# Patient Record
(demographics unavailable — no encounter records)

---

## 2024-10-17 NOTE — PLAN
[FreeTextEntry1] : anxiety- wean sertraline off and start escitalopram  12 minutes was spent caring for the patient today.  This includes time spent before the visit reviewing the chart, time spent during the visit,  time spent counseling, and educating the patient on the disease course and it's treatment/ management and as well as time spent after the visit on documentation.

## 2024-10-17 NOTE — HISTORY OF PRESENT ILLNESS
[Home] : at home, [unfilled] , at the time of the visit. [Medical Office: ([facility])___] : at the medical office located in  [Verbal consent obtained from patient] : the patient, [unfilled] [FreeTextEntry1] : anxiety [de-identified] : anxiety- last visit- started sertraline.  pt weaned off zoloft.  KUMAR 7 score 13.  sleep unchg. no nausea or suicidal thoughts. pt concerned about wgt gain on sertraline

## 2024-10-22 NOTE — CONSULT LETTER
[Dear  ___] : Dear  [unfilled], [Consult Letter:] : I had the pleasure of evaluating your patient, [unfilled]. [Please see my note below.] : Please see my note below. [Consult Closing:] : Thank you very much for allowing me to participate in the care of this patient.  If you have any questions, please do not hesitate to contact me. [Sincerely,] : Sincerely, [DrMonique  ___] : Dr. RAMIREZ [FreeTextEntry2] : Ayaka Diego MD [FreeTextEntry3] : Donato Nice M.D., FACP Professor of Medicine [employer] [employer] [facility] [employer] at [facility] Associate Chief, Division of Hematology [place] [phone]

## 2024-10-22 NOTE — ASSESSMENT
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 2 of her sons also have chronic neutropenia. I have seen 1 of them in consultation and he has a similar history. Screening for congenital neutropenia had been recommended to him, but he never returned for the testing. 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 These patients are not at an increased risk for serious infection. Her neutropenia has been borderline, She was also found to have IgG kappa MGUS present in miniscule amounts I reviewed both diagnoses in detail with her. I explained that MGUS is part of the normal aging process, that 5% of individuals are affected by age 60 and 25% by age 90.  I reviewed that there is a small risk of transformation to non-Hodgkin lymphoma or multiple myeloma. We reviewed the need to monitor for this. All questions were answered.  Plan: Observe CMP, LDH, SPEP, Quant Ig, SFLC, Iron/TIBC, B12, MAG antibodies Seek immediate medical attention if febrile.  Avoid IV CT scan contrast dye.  PCP/Neuro/GI/Podiatry f/u RTC 6 months

## 2024-10-22 NOTE — REVIEW OF SYSTEMS
[Joint Pain] : joint pain [Fatigue] : fatigue [SOB on Exertion] : shortness of breath during exertion [Negative] : Neurological

## 2024-10-22 NOTE — PHYSICAL EXAM
[Restricted in physically strenuous activity but ambulatory and able to carry out work of a light or sedentary nature] : Status 1- Restricted in physically strenuous activity but ambulatory and able to carry out work of a light or sedentary nature, e.g., light house work, office work [Normal] : affect appropriate [de-identified] : Bilateral arcus senilis [de-identified] : Heberden's nodes. Feet benign.

## 2024-10-22 NOTE — RESULTS/DATA
[FreeTextEntry1] : WBC 3,390 Hgb 13.9 Hct 41.6 MCV 87.2 Platelets 280,000 Diff 38P 53L 6M 2Eo 1Ba ANC 1,280   4/16/24 CMP normal   SPEP M-spike 0.1, gamma-migrating paraprotein identified.  SPIEP IgG kappa band identified.  IgG 860 IgA 144 IgM 83 SFLC kappa 2.04 lambda 0.94 ratio 2.17  Beta 2 microglobulin 1.7 CRP 4 Cryoglobulin negative

## 2024-10-22 NOTE — ADDENDUM
[FreeTextEntry1] : I, Oswaldo Cabrera, acted solely as a scribe for Dr. Donato Nice on 10/22/2024 . All medical entries made by the Scribe were at my, Dr. Donato Nice's, direction and personally dictated by me on 10/22/2024 . I have reviewed the chart and agree that the record accurately reflects my personal performance of the history, physical exam, assessment and plan. I have also personally directed, reviewed, and agreed with the chart.

## 2024-10-22 NOTE — HISTORY OF PRESENT ILLNESS
[Disease:__________________________] : Disease: [unfilled] [de-identified] : Thyroid cancer - thyroidectomy Benign ethnic neutropenia - Devi blood group Fya and Fyb negative 2 sons have leukopenia [de-identified] : She feels fatigued. Affects her ability to perform. Notes SAINI after walking a short distance. Seeing Cardiology. Chronic lower back discomfort persists. Sees Neuro. Received epidural injection in her back with no relief. Also notes episodes of numbness and tingling in her feet. Saw Neurology and evaluation for neuropathy was normal.  Still gets upper respiratory infections involving her sinuses. Takes antibiotics for relief. Pain in both feet and toes persists, Notes left big toenail turns black in color. Will see Podiatrist soon. Is unable to take toe polish off. She is still constipated. Sees Gastroenterology for this. She notes no fever, night sweats, swollen glands, skin infections, rash, chest pain, bleeding, bruising, headaches, visual problems. abdominal pain, other skeletal pain, Declines vaccinations.

## 2025-02-05 NOTE — PHYSICAL EXAM
[No Acute Distress] : no acute distress [Well Nourished] : well nourished [Well Developed] : well developed [Well-Appearing] : well-appearing [Normal Sclera/Conjunctiva] : normal sclera/conjunctiva [Normal Oropharynx] : the oropharynx was normal [No Lymphadenopathy] : no lymphadenopathy [Supple] : supple [No Respiratory Distress] : no respiratory distress  [No Accessory Muscle Use] : no accessory muscle use [Clear to Auscultation] : lungs were clear to auscultation bilaterally [Normal Rate] : normal rate  [Regular Rhythm] : with a regular rhythm [Normal S1, S2] : normal S1 and S2 [No Murmur] : no murmur heard [No Edema] : there was no peripheral edema [Soft] : abdomen soft [Non-distended] : non-distended [No Masses] : no abdominal mass palpated [Normal Bowel Sounds] : normal bowel sounds [Normal Posterior Cervical Nodes] : no posterior cervical lymphadenopathy [Normal Anterior Cervical Nodes] : no anterior cervical lymphadenopathy [Grossly Normal Strength/Tone] : grossly normal strength/tone [No Focal Deficits] : no focal deficits [Normal Gait] : normal gait [Normal Affect] : the affect was normal [Normal Insight/Judgement] : insight and judgment were intact [No Carotid Bruits] : no carotid bruits [de-identified] : diffuse tenderness

## 2025-02-05 NOTE — REVIEW OF SYSTEMS
[Dyspnea on Exertion] : dyspnea on exertion [Abdominal Pain] : abdominal pain [Constipation] : constipation [Fever] : no fever [Chills] : no chills [Earache] : no earache [Sore Throat] : no sore throat [Chest Pain] : no chest pain [Palpitations] : no palpitations [Lower Ext Edema] : no lower extremity edema [Wheezing] : no wheezing [Cough] : no cough [Nausea] : no nausea [Diarrhea] : diarrhea [Vomiting] : no vomiting [Dysuria] : no dysuria [Dizziness] : no dizziness

## 2025-02-05 NOTE — PLAN
[FreeTextEntry1] : need results of CT angio, Cardio notes. done at [facility], GI records, ENT notes stop NSAID, ASA, over the counter meds 1 wk prior to procedure non fasting labs- Blood was collected in the office. EKG- NSR 60.  Twave inv- ant- no chg c/o 5/29/24 EKG 30 minutes was spent caring for the patient today.  This includes time spent before the visit reviewing the chart, time spent during the visit,  time spent counseling, and educating the patient on the disease course and it's treatment/ management and as well as time spent after the visit on documentation.

## 2025-02-13 NOTE — PHYSICAL EXAM
[No Acute Distress] : no acute distress [Well Nourished] : well nourished [Well Developed] : well developed [Normal Oropharynx] : normal oropharynx [Retrognathia] : retrognathia [III] : Mallampati Class: III [Normal Rate/Rhythm] : normal rate/rhythm [Normal S1, S2] : normal s1, s2 [No Resp Distress] : no resp distress [Clear to Auscultation Bilaterally] : clear to auscultation bilaterally [No Edema] : no edema [Normal Affect] : normal affect

## 2025-02-14 NOTE — ASSESSMENT
[FreeTextEntry1] : 59F never smoker with hx of thyroid cancer s/p complete thyroidectomy (on synthroid), NAFLD, chronic sinusitis, who presents for pulmonary clearance for sinus surgery, anticipated on 2/21/25 with Dr. Ashley Pruett at [facility]. She has hx of recurrent sinus infections requiring antibiotics. She is currently using Ryaltris nasal spray ( olopatadine hydrochloride and mometasone furoate). SAINI as a result of nasal congestion and mouth breathing.   Her CXR is clear. PFTs 2/13/25 normal with mild bd response Sinus images ([facility]) and cardiac workup ([facility]) are not available to us today. -pt to brig these ct sinuas for me to review---- She is on synthroid but has not been able to see her endocrinologist in over a yr and no recent thyroid functions   On Exam lungs are clear, Spo2 normal. She has a crowded upper airway and retrognathia.  In setting of snoring, suspect JAQUELIN  Plan: ch sinusitis--possible jaquelin- Labs: cbc with diff, tsh, t4, asthma rast, igE add nasal saline gel, followed by Ryaltris nasal spray bid, add ipratropium nasal spray bid  Pt will call sleep lab to schedule HST Follow up in 4 weeks

## 2025-02-14 NOTE — HISTORY OF PRESENT ILLNESS
[Never] : never [TextBox_4] : 59F with hx of thyroid cancer s/p complete thyroidectomy (on synthroid), NAFLD, chronic sinusitis, who presents for pulmonary clearance for sinus surgery, anticipated on 2/21/25 with Dr. Ashley Pruett at [facility].   She denies hx of asthma. No prior lung disease. She has recurrent sinus infections requiring antibiotics. She is currently using Ryaltris nasal spray ( olopatadine hydrochloride and mometasone furoate).  She has hx of dyspnea on exertion. Was seen by cardio and is s/p angiogram at [facility] which was reportedly ok. We dont have records. She has post nasal drip, chronic congestion, snoring, sinus pressure/pain, nasal discharge.  Dyspnea occurs on exertion, when she is required to breath through her mouth causing a sore throat and burning sensation in her throat.  Denies recent illness, fever, chills, wheezing or chest tightness.   never smoker

## 2025-02-14 NOTE — REVIEW OF SYSTEMS
[Nasal Congestion] : nasal congestion [Postnasal Drip] : postnasal drip [Negative] : Constitutional [Epistaxis] : no epistaxis

## 2025-02-14 NOTE — END OF VISIT
[FreeTextEntry3] :   I, Dr. Maegan Caldera  personally performed the evaluation and management (E/M) services for this established patient who presents today with (a) new problem(s)/exacerbation of (an) existing condition(s). That E/M includes conducting the clinically appropriate interval history &/or exam, assessing all new/exacerbated conditions, and establishing a new plan of care. Today, my AUBRIE, Michelle Guerrero NP, , was here to observe my evaluation and management service for this new problem/exacerbated condition and follow the plan of care established by me going forward. [Time Spent: ___ minutes] : I have spent [unfilled] minutes of time on the encounter which excludes teaching and separately reported services.

## 2025-02-14 NOTE — HISTORY OF PRESENT ILLNESS
[Never] : never [TextBox_4] : 59F with hx of thyroid cancer s/p complete thyroidectomy (on synthroid), NAFLD, chronic sinusitis, who presents for pulmonary clearance for sinus surgery, anticipated on 2/21/25 with Dr. Ashley Pruett at [facility].   She denies hx of asthma. No prior lung disease. She has recurrent sinus infections requiring antibiotics. She is currently using Ryaltris nasal spray ( olopatadine hydrochloride and mometasone furoate).  She has hx of dyspnea on exertion. Was seen by cardio and is s/p angiogram at [facility] which was reportedly ok. We dont have records. She has post nasal drip, chronic congestion, snoring, sinus pressure/pain, nasal discharge.  Dyspnea occurs on exertion, when she is required to breath through her mouth causing a sore throat and burning sensation in her throat.  Denies recent illness, fever, chills, wheezing or chest tightness.   never smoker

## 2025-03-06 NOTE — HEALTH RISK ASSESSMENT
[3] : 2) Feeling down, depressed, or hopeless for nearly every day (3) [PHQ-2 Positive] : PHQ-2 Positive [Several Days (1)] : 6.) Feeling bad about yourself, or that you are a failure, or have let yourself or your family down? Several days [Nearly Every Day (3)] : 7.) Trouble concentrating on things, such as reading a newspaper or watching television? Nearly every day [1/2 of Days or More (2)] : 8.) Moving or speaking so slowly that other people could have noticed, or the opposite, moving or speaking faster than usual? Half the days or more [Not at All (0)] : 9.) Thoughts that you would be off dead or of hurting yourself in some way? Not at all [Severe] : Severity of Depression is Severe [Somewhat Difficult] : How difficult have these problems made it for you to do your work, take care of things at home, or get along with people? Somewhat difficult [[id number]] : 6 [[id number]] : 21

## 2025-03-06 NOTE — HISTORY OF PRESENT ILLNESS
[FreeTextEntry1] : depresssion [de-identified] : reviewed 2/13/25 labs -nl, 2/5/25 labs-nl depression-.  pt states she doesn't have anxiety- wants to try wellbutrin c/o 5 yr of back pain radiating down leg -burning- bottom of feet- states he was seen by Neuro and nerve test was done.  c/o toe, foot pain b/l for 5-6 mo- pt states she saw the Podiatrist- lipid- pt refused statin , repatha. MGUS, Neutropenia- reviewed 10/22/24 Hematology- benign ethnic neutropenia.  reviewed 10/22/24 kappa/ lambda FLC 1.77  hypothyroid- reviewed 2/26/24 Endo notes- compliant w thyroid meds. chronic abd pain, constip- seen GI at [facility]- 2 days ago, rec US, EGD, colonoscopy, labs.  pt cont to have HA,- pt was seen Neuro, Dr. Winkler- and was give rizatriptan pt states she had L shoulder pain during stress test last wk -  reivewed 6/4/24 Cardio notes ,  reviewed8/30/24 ECHO- EF 60-65%,  2/5/25 CXR - no active dis.  reivewed 8/30/24 Cat-EF 60%, non obst CAD.  reviewed 8/19/24  Stress test- non diagnostic reviewd 8/30/24 US venous- no DVT reviewed 2/13/25 [facility] notes- rec Sleep Study

## 2025-03-06 NOTE — PHYSICAL EXAM
[TextEntry] : Constitutional: no acute distress, well nourished, well developed and well-appearing. Pulmonary: no respiratory distress, lungs were clear to auscultation bilaterally, no accessory muscle use. Cardiac: normal rate, with a regular rhythm, normal S1 and S2 and no murmur heard.  Vascular: there was no peripheral edema. Abdomen: abdomen soft, chronic periumbilical-tender, non-distended, no abdominal mass palpated and normal bowel sounds. Psychiatric: the affect was normal and insight and judgement were intact

## 2025-03-06 NOTE — PLAN
[FreeTextEntry1] : pt insisting on DEXA- discussed that it now be covered depression- trial of wellbutrin- SE discussed w pt.

## 2025-03-17 NOTE — ASSESSMENT
[FreeTextEntry1] : 59F never smoker with hx of thyroid cancer s/p complete thyroidectomy (on synthroid), NAFLD, chronic sinusitis, who presents for pulmonary clearance for sinus surgery, anticipated on 2/21/25 with Dr. Ashley Pruett at [facility]. She has hx of recurrent sinus infections requiring antibiotics. She is currently using Ryaltris nasal spray ( olopatadine hydrochloride and mometasone furoate). SAINI as a result of nasal congestion and mouth breathing.   1) Chronic sinus congestion -Continue with nasal sprays daily -Repeat CT sinuses in 3 months -Educated on sinus massages to perform daily -----Also advised to follow-up with her ENT attending for possible sinus surgery -Labs collected in office today -HST performed, awaiting results -RTO in 4 months  2) Hx thyroid CA s/p t thyroid surgery -Referred for endocrine follow up

## 2025-03-17 NOTE — END OF VISIT
[Time Spent: ___ minutes] : I have spent [unfilled] minutes of time on the encounter which excludes teaching and separately reported services. [FreeTextEntry3] :   I, Dr. Maegan Caldera  personally performed the evaluation and management (E/M) services for this established patient who presents today with (a) new problem(s)/exacerbation of (an) existing condition(s). That E/M includes conducting the clinically appropriate interval history &/or exam, assessing all new/exacerbated conditions, and establishing a new plan of care. Today, my AUBRIE, Michelle Guerrero NP, , was here to observe my evaluation and management service for this new problem/exacerbated condition and follow the plan of care established by me going forward.

## 2025-03-17 NOTE — ASSESSMENT
[FreeTextEntry1] : 59F never smoker with hx of thyroid cancer s/p complete thyroidectomy (on synthroid), NAFLD, chronic sinusitis, who presents for pulmonary clearance for sinus surgery, anticipated on 2/21/25 with Dr. Ashley Pruett at [facility]. She has hx of recurrent sinus infections requiring antibiotics. She is currently using Ryaltris nasal spray ( olopatadine hydrochloride and mometasone furoate). SAINI as a result of nasal congestion and mouth breathing.   1) Chronic sinus congestion -Continue with nasal sprays daily -Repeat CT sinuses in 3 months -Educated on sinus massages to perform daily -----Also advised to follow-up with her ENT attending for possible sinus surgery -Labs collected in office today -HST performed, awaiting results -RTO in 4 months  2) Hx thyroid CA s/p t thyroid surgery -Referred for endocrine follow up

## 2025-03-17 NOTE — HISTORY OF PRESENT ILLNESS
[Never] : never [TextBox_4] : 59F with hx of thyroid cancer s/p complete thyroidectomy (on synthroid), NAFLD, chronic sinusitis, who presents for pulmonary clearance for sinus surgery, anticipated on 2/21/25 with Dr. Ashley Pruett at [facility].   She denies hx of asthma. No prior lung disease. She has recurrent sinus infections requiring antibiotics. She is currently using Ryaltris nasal spray ( olopatadine hydrochloride and mometasone furoate).  She has hx of dyspnea on exertion. Was seen by cardio and is s/p angiogram at [facility] which was reportedly ok. We dont have records. She has post nasal drip, chronic congestion, snoring, sinus pressure/pain, nasal discharge.  Dyspnea occurs on exertion, when she is required to breath through her mouth causing a sore throat and burning sensation in her throat.  Denies recent illness, fever, chills, wheezing or chest tightness.   never smoker   03/2025 past history of thyroid surgery for thyroid cancer patient continues to c/o chronic congestion, sinus pressure and post nasal drip despite daily nasal sprays had HST last week --- awaiting results

## 2025-03-17 NOTE — HISTORY OF PRESENT ILLNESS
[Never] : never [TextBox_4] : 59F with hx of thyroid cancer s/p complete thyroidectomy (on synthroid), NAFLD, chronic sinusitis, who presents for pulmonary clearance for sinus surgery, anticipated on 2/21/25 with Dr. Ashley Pruett at [facility].   She denies hx of asthma. No prior lung disease. She has recurrent sinus infections requiring antibiotics. She is currently using Ryaltris nasal spray ( olopatadine hydrochloride and mometasone furoate).  She has hx of dyspnea on exertion. Was seen by cardio and is s/p angiogram at [facility] which was reportedly ok. We dont have records. She has post nasal drip, chronic congestion, snoring, sinus pressure/pain, nasal discharge.  Dyspnea occurs on exertion, when she is required to breath through her mouth causing a sore throat and burning sensation in her throat.  Denies recent illness, fever, chills, wheezing or chest tightness.   never smoker   03/2025 past history of thyroid surgery for thyroid cancer patient continues to c/o chronic congestion, sinus pressure and post nasal drip despite daily nasal sprays had HST last week --- awaiting results

## 2025-03-17 NOTE — ASSESSMENT
[FreeTextEntry1] : 59F never smoker with hx of thyroid cancer s/p complete thyroidectomy (on synthroid), NAFLD, chronic sinusitis, who presents for pulmonary clearance for sinus surgery, anticipated on 2/21/25 with Dr. Ashley Pruett at [facility]. She has hx of recurrent sinus infections requiring antibiotics. She is currently using Ryaltris nasal spray ( olopatadine hydrochloride and mometasone furoate). SAINI as a result of nasal congestion and mouth breathing.   1) Chronic sinus congestion -Continue with nasal sprays daily -Repeat CT sinuses in 3 months -Educated on sinus massages to perform daily -----Also advised to follow-up with her ENT attending for possible sinus surgery -Labs collected in office today -HST performed, awaiting results -RTO in 4 months  2) Hx thyroid CA s/p t thyroid surgery -Referred for endocrine follow up

## 2025-03-17 NOTE — HISTORY OF PRESENT ILLNESS
[Never] : never [TextBox_4] : 59F with hx of thyroid cancer s/p complete thyroidectomy (on synthroid), NAFLD, chronic sinusitis, who presents for pulmonary clearance for sinus surgery, anticipated on 2/21/25 with Dr. Ashley Pruett at [facility].   She denies hx of asthma. No prior lung disease. She has recurrent sinus infections requiring antibiotics. She is currently using Ryaltris nasal spray ( olopatadine hydrochloride and mometasone furoate).  She has hx of dyspnea on exertion. Was seen by cardio and is s/p angiogram at [facility] which was reportedly ok. We dont have records. She has post nasal drip, chronic congestion, snoring, sinus pressure/pain, nasal discharge.  Dyspnea occurs on exertion, when she is required to breath through her mouth causing a sore throat and burning sensation in her throat.  Denies recent illness, fever, chills, wheezing or chest tightness.   never smoker   03/2025 past history of thyroid surgery for thyroid cancer patient continues to c/o chronic congestion, sinus pressure and post nasal drip despite daily nasal sprays had HST last week --- awaiting results

## 2025-04-28 NOTE — HEALTH RISK ASSESSMENT
[3] : 1) Little interest or pleasure doing things for nearly every day (3) [2] : 2) Feeling down, depressed, or hopeless for more than half of the days (2) [PHQ-2 Positive] : PHQ-2 Positive [1/2 of Days or More (2)] : 2.) Feeling down, depressed or hopeless? Half the days or more [Nearly Every Day (3)] : 3.) Trouble falling asleep, or sleeping too much? Nearly every day [Several Days (1)] : 7.) Trouble concentrating on things, such as reading a newspaper or watching television? Several days [Not at All (0)] : 9.) Thoughts that you would be off dead or of hurting yourself in some way? Not at all [Mild] : Severity of Depression is Mild [Somewhat Difficult] : How difficult have these problems made it for you to do your work, take care of things at home, or get along with people? Somewhat difficult [[id number]] : 9

## 2025-04-28 NOTE — PLAN
[FreeTextEntry1] : depression- cont wellbutrin- f/u in office in 2 mo pt states she was given med for GERD  15 minutes was spent caring for the patient today.  This includes time spent before the visit reviewing the chart, time spent during the visit,  time spent counseling, and educating the patient on the disease course and it's treatment/ management and as well as time spent after the visit on documentation.

## 2025-04-28 NOTE — HISTORY OF PRESENT ILLNESS
[Home] : at home, [unfilled] , at the time of the visit. [Medical Office: ([facility])___] : at the medical office located in  [Telehealth (audio & video)] : This visit was provided via telehealth using real-time 2-way audio visual technology. [Verbal consent obtained from patient] : the patient, [unfilled] [de-identified] :  depression-.   last visit started Wellbutrin- 1 mo ago.  no SE- no  nausea, dizziness. decr urge to just eat and feels more energetic.  PH9 score 9  reviewed 3/17/25 Pulm notes- rec CT chest reviewed 2/11/25 CXR -nl reviewed 4/1/25 EGD- esophagitis, HH reviewed 4/1/25 colonoscopy- 3 polyps, diverticulosis, hemorrhoid.  rpt in 5 yrs.

## 2025-05-29 NOTE — HISTORY OF PRESENT ILLNESS
[de-identified] : reviewed 2/13/25 labs -nl.  rec DEXA depression-.   last visit started Wellbutrin- 1 mo ago.  no SE- no  nausea, dizziness. decr urge to just eat and feels more energetic.  PH9 score 9  reviewed 3/17/25 Pulm notes- rec CT chest reviewed 2/11/25 CXR -nl reviewed 4/1/25 EGD- esophagitis, HH reviewed 4/1/25 colonoscopy- 3 polyps, diverticulosis, hemorrhoid.  rpt in 5 yrs.   c/o 5 yr of back pain radiating down leg -burning- bottom of feet- states he was seen by Neuro and nerve test was done.  pt ref to Neuro, Rehab c/o toe, foot pain b/l for 5-6 mo- pt states she saw the Podiatrist- lipid- pt refused statin , repatha. MGUS, Neutropenia- reviewed 10/22/24 Hematology- benign ethnic neutropenia.  reviewed 10/22/24 kappa/ lambda FLC 1.77  hypothyroid- reviewed 2/26/24 Endo notes- compliant w thyroid meds. chronic abd pain, constip- seen GI at [place]- 2 days ago, rec US, EGD, colonoscopy, labs. - ref to GI pt cont to have HA,- pt was seen Neuro, Dr. Winkler- and was give rizatriptan pt states she had L shoulder pain during stress test last wk -  reivewed 6/4/24 Cardio notes ,  reviewed8/30/24 ECHO- EF 60-65%,  2/5/25 CXR - no active dis.  reivewed 8/30/24 Cat-EF 60%, non obst CAD.  reviewed 8/19/24  Stress test- non diagnostic reviewed 8/30/24 US venous- no DVT reviewed 2/13/25 [facility] notes- rec Sleep Study

## 2025-06-02 NOTE — END OF VISIT
[FreeTextEntry3] : I personally saw and examined Ms. FITO RAINEY in detail this visit today. I personally reviewed the HPI, PMH, FH, SH, ROS and medications/allergies. I have spoken to DINORA Murray regarding the history and have personally determined the assessment and plan of care, and documented this myself. I was present and participated in all key portions of the encounter and all procedures noted above. I have made changes in the body of the note where appropriate.   Attesting Faculty: See Attending Signature Below

## 2025-06-02 NOTE — REASON FOR VISIT
[Initial Consultation] : an initial consultation for [FreeTextEntry2] : recurrent sinus infections

## 2025-06-02 NOTE — CONSULT LETTER
[Dear  ___] : Dear  [unfilled], [Consult Letter:] : I had the pleasure of evaluating your patient, [unfilled]. [Please see my note below.] : Please see my note below. [Consult Closing:] : Thank you very much for allowing me to participate in the care of this patient.  If you have any questions, please do not hesitate to contact me. [Sincerely,] : Sincerely, [FreeTextEntry3] : Sissy Monique MD Otolaryngology and Cranial Base Surgery  Attending Physician- Department of Otolaryngology and Head & Neck Surgery  [facility] [facility] Office: [phone]  Fax: [phone]

## 2025-06-02 NOTE — PROCEDURE
[de-identified] : Dr. Monique  [de-identified] : Reason for nasal endoscopy: anterior rhinoscopy insufficient to account for symptoms.   Flexible scope #2 was used. Right nasal passage with hypertrophic inferior, middle and superior turbinates. Nasal passage patent with clear middle meatus and sphenoethmoid recess. Left septal spur. Left nasal passage with hypertrophic inferior, middle and superior turbinates. Nasal passage was patent with clear middle meatus and sphenoethmoid recess. No mucopurulence or polyps appreciated. Nasopharynx clear

## 2025-06-02 NOTE — PHYSICAL EXAM
[Nasal Endoscopy Performed] : nasal endoscopy was performed, see procedure section for findings [] : septum deviated to the left [Normal] : mucosa is normal [Midline] : trachea located in midline position [de-identified] : hypertrophic bilateral

## 2025-06-02 NOTE — HISTORY OF PRESENT ILLNESS
[de-identified] : 60y/o female who came in with recurrent sinus infections for the past 9 months. She states when has a sinus infection she will get facial pain and pressure along with purulent drainage. She is always put on abx. She uses the nasal saline rinses 3 times a day. She uses azelastine and ryaltris but it does not seem to help. She states she has tried a steroid in the rinses. She has a PND daily. She had a CT sinus in 11/2024 that shows sinus disease. Her last sinus infection was last week along with a cough and was seen at the hospital. They told her lungs were a little inflamed and was put on azithromycin and Augmentin. She states she was allergy tested, and it was negative. She has FAVIAN and uses cpap. She notes that she has had several evals for surgery but is not willing to have a procedure if it is not guaranteed to work.

## 2025-06-12 NOTE — HEALTH RISK ASSESSMENT
[3] : 2) Feeling down, depressed, or hopeless for nearly every day (3) [PHQ-2 Positive] : PHQ-2 Positive [Several Days (1)] : 6.) Feeling bad about yourself, or that you are a failure, or have let yourself or your family down? Several days [Nearly Every Day (3)] : 7.) Trouble concentrating on things, such as reading a newspaper or watching television? Nearly every day [1/2 of Days or More (2)] : 8.) Moving or speaking so slowly that other people could have noticed, or the opposite, moving or speaking faster than usual? Half the days or more [Not at All (0)] : 9.) Thoughts that you would be off dead or of hurting yourself in some way? Not at all [Severe] : Severity of Depression is Severe [Somewhat Difficult] : How difficult have these problems made it for you to do your work, take care of things at home, or get along with people? Somewhat difficult [JSY6Ybquo] : 6 [[id number]] : 21

## 2025-06-12 NOTE — PLAN
[FreeTextEntry1] :  45 minutes was spent caring for the patient today.  This includes time spent before the visit reviewing the chart, time spent during the visit,  time spent counseling, and educating the patient on the disease course and it's treatment/ management and as well as time spent after the visit on documentation.   esophagitis- start PPI

## 2025-06-12 NOTE — HISTORY OF PRESENT ILLNESS
[FreeTextEntry1] : depresssion [de-identified] : reviewed 2/13/25 labs -nl, 2/5/25 labs-nl.  pt was in the ER at [facility] 3 wk for pneumonia- told she had flu. tx w zpac, Augmentin.   Now 100% better.  no cough or SOB. depression-.  last visit started Wellbutrin- 3 mo ago.  no SE- no  nausea, dizziness. decr urge to just eat and feels more energetic.  PH 9 score 9    reviewed 5/20/25 labs, CXR-- b/l lower lobe peribronchial thickening and ill defined opacities- atelectasis  or bronchopneumonia when she had pneumonia, she also dev vertigo- spinning sensation when she gets up.  now 90 % better  reviewed 3/17/25 Pulm notes- rec CT  sinus c/o 5 yr of back pain radiating down leg -burning- bottom of feet- states he was seen by Neuro and nerve test was done.  c/o toe, foot pain b/l for 5-6 mo- pt states she saw the Podiatrist- lipid- pt refused statin , repatha. MGUS, Neutropenia- reviewed 10/22/24 Hematology- benign ethnic neutropenia.  reviewed 10/22/24 kappa/ lambda FLC 1.77  hypothyroid- reviewed 2/26/24 Endo notes- compliant w thyroid meds. chronic abd pain, constip- seen GI at [place]- 2 days ago, rec US, EGD- rpt in 5 mo, colonoscopy, lab- pt was told to rpt EGD in 5 mo c/o burning in her throat- was given Pantoprazole but it finished  and pt now has burning again.  reviewed 4/1/25 EGD- esophagitis, HH reviewed 4/1/25 colonoscopy- 3 polyps, diverticulosis, hemorrhoid.  rpt in 5 yrs. pt cont to have HA,- pt was seen Neuro, Dr. Winkler- and was give rizatriptan pt states she had L shoulder pain during stress test last wk -  reviewed 6/4/24 Cardio notes ,  reviewed8/30/24 ECHO- EF 60-65%,  2/5/25 CXR - no active dis.  reviewed 8/30/24 Cat-EF 60%, non obst CAD.  reviewed 8/19/24  Stress test- non diagnostic

## 2025-07-17 NOTE — ASSESSMENT
[FreeTextEntry1] : 59F never smoker with hx of thyroid cancer s/p complete thyroidectomy (on synthroid), NAFLD, chronic sinusitis, who presents for pulmonary clearance for sinus surgery, anticipated on 2/21/25 with Dr. Ashley Pruett at [facility]. She has hx of recurrent sinus infections requiring antibiotics. She is currently using Ryaltris nasal spray ( olopatadine hydrochloride and mometasone furoate). SAINI as a result of nasal congestion and mouth breathing.   1) Chronic sinus congestion -Continue with nasal sprays daily -Also advised to follow-up with her ENT   2) Hx thyroid CA s/p t thyroid surgery -f/u with  endocrine  3) h/o esophageal ulcer? burning- referred to GI Dr Cardenas 4) nocturnal oxygen for hypoxemia on sleep study 5) f/u in 6m  If you have any questions  I can be reached  at # [phone] (office).  Thank you,  Maegan Caldera MD, [facility]

## 2025-07-17 NOTE — ASSESSMENT
[FreeTextEntry1] : 59F never smoker with hx of thyroid cancer s/p complete thyroidectomy (on synthroid), NAFLD, chronic sinusitis, who presents for pulmonary clearance for sinus surgery, anticipated on 2/21/25 with Dr. Ashley Pruett at [facility]. She has hx of recurrent sinus infections requiring antibiotics. She is currently using Ryaltris nasal spray ( olopatadine hydrochloride and mometasone furoate). SAINI as a result of nasal congestion and mouth breathing.   1) Chronic sinus congestion -Continue with nasal sprays daily -Also advised to follow-up with her ENT   2) Hx thyroid CA s/p t thyroid surgery -f/u with  endocrine  3) h/o esophageal ulcer? burning- referred to GI Dr Cardenas 4) nocturnal oxygen for hypoxemia on sleep study 5) f/u in 6m  If you have any questions  I can be reached  at # [phone] (office).  Thank you,  Maegan Caldera MD, [facility]

## 2025-07-17 NOTE — ASSESSMENT
[FreeTextEntry1] : 59F never smoker with hx of thyroid cancer s/p complete thyroidectomy (on synthroid), NAFLD, chronic sinusitis, who presents for pulmonary clearance for sinus surgery, anticipated on 2/21/25 with Dr. Ashley Pruett at [facility]. She has hx of recurrent sinus infections requiring antibiotics. She is currently using Ryaltris nasal spray ( olopatadine hydrochloride and mometasone furoate). SAINI as a result of nasal congestion and mouth breathing.   1) Chronic sinus congestion -Continue with nasal sprays daily -Also advised to follow-up with her ENT   2) Hx thyroid CA s/p t thyroid surgery -f/u with  endocrine  3) h/o esophageal ulcer? burning- referred to GI Dr Cardenas 4) nocturnal oxygen for hypoxemia on sleep study 5) f/u in 6m  If you have any questions  I can be reached  at # [phone] (office).  Thank you,  Maegan Caldera MD, [facility]

## 2025-07-17 NOTE — HISTORY OF PRESENT ILLNESS
[Never] : never [TextBox_4] : 59F with hx of thyroid cancer s/p complete thyroidectomy (on synthroid), NAFLD, chronic sinusitis, who presents for pulmonary clearance for sinus surgery, anticipated on 2/21/25 with Dr. Ashley Pruett at [facility].   She denies hx of asthma. No prior lung disease. She has recurrent sinus infections requiring antibiotics. She is currently using Ryaltris nasal spray ( olopatadine hydrochloride and mometasone furoate).  She has hx of dyspnea on exertion. Was seen by cardio and is s/p angiogram at [facility] which was reportedly ok. We dont have records. She has post nasal drip, chronic congestion, snoring, sinus pressure/pain, nasal discharge.  Dyspnea occurs on exertion, when she is required to breath through her mouth causing a sore throat and burning sensation in her throat.  Denies recent illness, fever, chills, wheezing or chest tightness.   never smoker   07/2025 past history of thyroid surgery for thyroid cancer follows ENT c/o chronic congestion, sinus pressure - somewhat improved- declined repeat CT sinus had HST which showed T,90=60% and was ordered nocturnal  oxygen h/o MGUS- follows heme h/o esophageal ulcer? burning-burning sensation at times

## 2025-07-17 NOTE — HISTORY OF PRESENT ILLNESS
[Never] : never [TextBox_4] : 59F with hx of thyroid cancer s/p complete thyroidectomy (on synthroid), NAFLD, chronic sinusitis, who presents for pulmonary clearance for sinus surgery, anticipated on 2/21/25 with Dr. Ashley Pruett at [facility].   She denies hx of asthma. No prior lung disease. She has recurrent sinus infections requiring antibiotics. She is currently using Ryaltris nasal spray ( olopatadine hydrochloride and mometasone furoate).  She has hx of dyspnea on exertion. Was seen by cardio and is s/p angiogram at [facility] which was reportedly ok. We dont have records. She has post nasal drip, chronic congestion, snoring, sinus pressure/pain, nasal discharge.  Dyspnea occurs on exertion, when she is required to breath through her mouth causing a sore throat and burning sensation in her throat.  Denies recent illness, fever, chills, wheezing or chest tightness.   never smoker   07/2025 past history of thyroid surgery for thyroid cancer follows ENT c/o chronic congestion, sinus pressure - somewhat improved- declined repeat CT sinus had HST which showed T,90=60% and was ordered nocturnal  oxygen h/o MGUS- follows heme h/o esophageal ulcer? burning-burning sensation at times

## 2025-07-17 NOTE — HISTORY OF PRESENT ILLNESS
[Never] : never [TextBox_4] : 59F with hx of thyroid cancer s/p complete thyroidectomy (on synthroid), NAFLD, chronic sinusitis, who presents for pulmonary clearance for sinus surgery, anticipated on 2/21/25 with Dr. Ashley Pruett at [facility].   She denies hx of asthma. No prior lung disease. She has recurrent sinus infections requiring antibiotics. She is currently using Ryaltris nasal spray ( olopatadine hydrochloride and mometasone furoate).  She has hx of dyspnea on exertion. Was seen by cardio and is s/p angiogram at [facility] which was reportedly ok. We dont have records. She has post nasal drip, chronic congestion, snoring, sinus pressure/pain, nasal discharge.  Dyspnea occurs on exertion, when she is required to breath through her mouth causing a sore throat and burning sensation in her throat.  Denies recent illness, fever, chills, wheezing or chest tightness.   never smoker   07/2025 past history of thyroid surgery for thyroid cancer follows ENT c/o chronic congestion, sinus pressure - somewhat improved- declined repeat CT sinus had HST which showed T,90=60% and was ordered nocturnal  oxygen h/o MGUS- follows heme h/o esophageal ulcer? burning-burning sensation at times